# Patient Record
Sex: MALE | Race: OTHER | Employment: FULL TIME | URBAN - METROPOLITAN AREA
[De-identification: names, ages, dates, MRNs, and addresses within clinical notes are randomized per-mention and may not be internally consistent; named-entity substitution may affect disease eponyms.]

---
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
## 2024-03-23 ENCOUNTER — APPOINTMENT (OUTPATIENT)
Dept: GENERAL RADIOLOGY | Age: 19
End: 2024-03-23

## 2024-03-23 ENCOUNTER — HOSPITAL ENCOUNTER (EMERGENCY)
Age: 19
Discharge: HOME | End: 2024-03-23
Attending: EMERGENCY MEDICINE

## 2024-03-23 VITALS
HEART RATE: 98 BPM | TEMPERATURE: 97.2 F | WEIGHT: 213 LBS | DIASTOLIC BLOOD PRESSURE: 92 MMHG | HEIGHT: 66 IN | BODY MASS INDEX: 34.23 KG/M2 | RESPIRATION RATE: 16 BRPM | OXYGEN SATURATION: 100 % | SYSTOLIC BLOOD PRESSURE: 146 MMHG

## 2024-03-23 DIAGNOSIS — S76.312A STRAIN OF LEFT HAMSTRING, INITIAL ENCOUNTER: Primary | ICD-10-CM

## 2024-03-23 PROCEDURE — 73552 X-RAY EXAM OF FEMUR 2/>: CPT

## 2024-03-23 PROCEDURE — 99283 EMERGENCY DEPT VISIT LOW MDM: CPT

## 2024-03-23 PROCEDURE — 73502 X-RAY EXAM HIP UNI 2-3 VIEWS: CPT

## 2024-03-23 PROCEDURE — 6370000000 HC RX 637 (ALT 250 FOR IP): Performed by: PHYSICIAN ASSISTANT

## 2024-03-23 RX ORDER — LIDOCAINE 4 G/G
1 PATCH TOPICAL DAILY
Qty: 30 PATCH | Refills: 0 | Status: SHIPPED | OUTPATIENT
Start: 2024-03-23 | End: 2024-04-22

## 2024-03-23 RX ORDER — IBUPROFEN 800 MG/1
800 TABLET ORAL EVERY 8 HOURS PRN
Qty: 20 TABLET | Refills: 0 | Status: SHIPPED | OUTPATIENT
Start: 2024-03-23

## 2024-03-23 RX ORDER — HYDROCODONE BITARTRATE AND ACETAMINOPHEN 5; 325 MG/1; MG/1
1 TABLET ORAL ONCE
Status: COMPLETED | OUTPATIENT
Start: 2024-03-23 | End: 2024-03-23

## 2024-03-23 RX ADMIN — HYDROCODONE BITARTRATE AND ACETAMINOPHEN 1 TABLET: 5; 325 TABLET ORAL at 10:59

## 2024-03-23 ASSESSMENT — ENCOUNTER SYMPTOMS
ABDOMINAL PAIN: 0
BACK PAIN: 0
COLOR CHANGE: 0
SHORTNESS OF BREATH: 0

## 2024-03-23 ASSESSMENT — PAIN SCALES - GENERAL: PAINLEVEL_OUTOF10: 10

## 2024-03-23 NOTE — ED PROVIDER NOTES
[facility] EMERGENCY DEPARTMENT  EMERGENCY DEPARTMENT ENCOUNTER        Pt Name: Alec Le  MRN: [id number]  Birthdate 2005  Date of evaluation: 3/23/2024  Provider: Cheri Phillips PA-C  PCP: No primary care provider on file.  Note Started: 11:12 AM EDT 3/23/24       I have seen and evaluated this patient with my supervising physician Stephen Rodriguez MD.      CHIEF COMPLAINT       Chief Complaint   Patient presents with    Leg Pain     Pt to ED c/o left leg pain after doing vertical splits after slipping on wet grass last night. PT took advil 200 mg x30 min ago.        HISTORY OF PRESENT ILLNESS: 1 or more Elements     History from : Patient    Limitations to history : None    Alec Le is a 19 y.o. male who presents to the emergency department complaining of left posterior hip and posterior thigh pain status post injury which occurred last night.  Patient states that he was drinking alcohol and did the splits on wet grass.  He felt a mild pull in the left hamstring.  This morning, the pain was more severe.  He took Advil prior to arrival.  He rates his pain to be a 10 out of 10 on pain scale.  Denies numbness, tingling, weakness, head trauma or other associated injury.  He is able to bear weight and ambulate.    Nursing Notes were all reviewed and agreed with or any disagreements were addressed in the HPI.    REVIEW OF SYSTEMS :      Review of Systems   Constitutional:  Negative for chills and fever.   Respiratory:  Negative for shortness of breath.    Cardiovascular:  Negative for chest pain, palpitations and leg swelling.   Gastrointestinal:  Negative for abdominal pain.   Genitourinary: Negative.    Musculoskeletal:  Positive for myalgias. Negative for back pain, gait problem, neck pain and neck stiffness.   Skin:  Negative for color change, pallor, rash and wound.   Neurological: Negative.    All other systems reviewed and are negative.      Positives and Pertinent

## 2024-03-23 NOTE — ED PROVIDER NOTES
I have personally performed a face to face diagnostic evaluation on this patient. I have fully participated in the care of this patient I personally saw the patient and performed a substantive portion of the visit including all aspects of the medical decision making.  I have reviewed and agree with all pertinent clinical information including history, physical exam, diagnostic tests, and the plan.      HPI: Alec Le presented with left posterior hip and left posterior thigh pain after he did the splits last night on wet grass.  [place] a pull in his left hamstring this morning pain is more severe worse with sitting better with standing.  Took Tylenol prior to arrival.  No numbness or tingling no bowel or bladder incontinence.  No other trauma.  Chief Complaint   Patient presents with    Leg Pain     Pt to ED c/o left leg pain after doing vertical splits after slipping on wet grass last night. PT took advil 200 mg x30 min ago.       Review of Systems: See LINDSEY note  Vital Signs: BP (!) 146/92   Pulse 98   Temp 97.2 °F (36.2 °C) (Oral)   Resp 16   Ht 1.676 m (5' 6\")   Wt 96.6 kg (213 lb)   SpO2 100%   BMI 34.38 kg/m²     Alert 19 y.o. male who does not appear toxic or acutely ill  HENT: Atraumatic  Neck: Grossly normal ROM  Chest/Lungs: respiratory effort normal  Extremities: 2+ DP/PT bilaterally  Musculoskeletal: Grossly normal ROM, tenderness to palpation of the posterior thigh, hamstrings, gluteus with normal range of motion and with no punched or easily palpated muscular strands, no saddle anesthesia, ambulatory without difficulty  Skin: No palor or diaphoresis    Medical Decision Making and Plan:  Pertinent Labs & Imaging studies reviewed. (See LINDSEY chart for details)  I agree with LINDSEY assessment and plan.  Well-appearing 19-year-old male who performed the splits last night while drinking on wet grass likely with a pulled muscle to his posterior hamstrings.  Exam is neurovascularly intact at this time.

## 2024-12-08 ENCOUNTER — HOSPITAL ENCOUNTER (EMERGENCY)
Age: 19
Discharge: HOME | End: 2024-12-08
Attending: STUDENT IN AN ORGANIZED HEALTH CARE EDUCATION/TRAINING PROGRAM

## 2024-12-08 ENCOUNTER — APPOINTMENT (OUTPATIENT)
Dept: GENERAL RADIOLOGY | Age: 19
End: 2024-12-08

## 2024-12-08 VITALS
OXYGEN SATURATION: 94 % | SYSTOLIC BLOOD PRESSURE: 112 MMHG | BODY MASS INDEX: 34.87 KG/M2 | RESPIRATION RATE: 18 BRPM | HEIGHT: 66 IN | WEIGHT: 217 LBS | HEART RATE: 80 BPM | TEMPERATURE: 97.1 F | DIASTOLIC BLOOD PRESSURE: 63 MMHG

## 2024-12-08 DIAGNOSIS — R06.4 HYPERVENTILATING: ICD-10-CM

## 2024-12-08 DIAGNOSIS — R29.0 CARPOPEDAL SPASM: ICD-10-CM

## 2024-12-08 DIAGNOSIS — E83.42 HYPOMAGNESEMIA: ICD-10-CM

## 2024-12-08 DIAGNOSIS — E87.6 HYPOKALEMIA: ICD-10-CM

## 2024-12-08 DIAGNOSIS — M79.604 ACUTE LEG PAIN, RIGHT: Primary | ICD-10-CM

## 2024-12-08 DIAGNOSIS — L53.9 ERYTHEMA: ICD-10-CM

## 2024-12-08 DIAGNOSIS — F12.90 MARIJUANA SMOKER: ICD-10-CM

## 2024-12-08 LAB
ALBUMIN SERPL-MCNC: 4.5 G/DL (ref 3.4–5)
ALBUMIN/GLOB SERPL: 1.3 {RATIO} (ref 1.1–2.2)
ALP SERPL-CCNC: 74 U/L (ref 40–129)
ALT SERPL-CCNC: 26 U/L (ref 10–40)
ANION GAP SERPL CALCULATED.3IONS-SCNC: 18 MMOL/L (ref 3–16)
AST SERPL-CCNC: 23 U/L (ref 15–37)
BASOPHILS # BLD: 0 K/UL (ref 0–0.2)
BASOPHILS NFR BLD: 0.2 %
BILIRUB SERPL-MCNC: 1 MG/DL (ref 0–1)
BUN SERPL-MCNC: 7 MG/DL (ref 7–20)
CALCIUM SERPL-MCNC: 9.8 MG/DL (ref 8.3–10.6)
CHLORIDE SERPL-SCNC: 98 MMOL/L (ref 99–110)
CO2 SERPL-SCNC: 20 MMOL/L (ref 21–32)
CREAT SERPL-MCNC: 0.9 MG/DL (ref 0.9–1.3)
DEPRECATED RDW RBC AUTO: 13 % (ref 12.4–15.4)
EOSINOPHIL # BLD: 0 K/UL (ref 0–0.6)
EOSINOPHIL NFR BLD: 0 %
FLUAV RNA RESP QL NAA+PROBE: NOT DETECTED
FLUBV RNA RESP QL NAA+PROBE: NOT DETECTED
GFR SERPLBLD CREATININE-BSD FMLA CKD-EPI: >90 ML/MIN/{1.73_M2}
GLUCOSE BLD-MCNC: 114 MG/DL
GLUCOSE BLD-MCNC: 114 MG/DL (ref 70–99)
GLUCOSE SERPL-MCNC: 111 MG/DL (ref 70–99)
HCT VFR BLD AUTO: 43.1 % (ref 40.5–52.5)
HGB BLD-MCNC: 15.4 G/DL (ref 13.5–17.5)
LYMPHOCYTES # BLD: 0.7 K/UL (ref 1–5.1)
LYMPHOCYTES NFR BLD: 4.7 %
MAGNESIUM SERPL-MCNC: 1.3 MG/DL (ref 1.8–2.4)
MCH RBC QN AUTO: 33.2 PG (ref 26–34)
MCHC RBC AUTO-ENTMCNC: 35.8 G/DL (ref 31–36)
MCV RBC AUTO: 92.7 FL (ref 80–100)
MONOCYTES # BLD: 0.8 K/UL (ref 0–1.3)
MONOCYTES NFR BLD: 5.6 %
NEUTROPHILS # BLD: 13.3 K/UL (ref 1.7–7.7)
NEUTROPHILS NFR BLD: 89.5 %
PERFORMED ON: ABNORMAL
PLATELET # BLD AUTO: 228 K/UL (ref 135–450)
PMV BLD AUTO: 8 FL (ref 5–10.5)
POTASSIUM SERPL-SCNC: 3.1 MMOL/L (ref 3.5–5.1)
PROT SERPL-MCNC: 8 G/DL (ref 6.4–8.2)
RBC # BLD AUTO: 4.65 M/UL (ref 4.2–5.9)
SARS-COV-2 RNA RESP QL NAA+PROBE: NOT DETECTED
SODIUM SERPL-SCNC: 136 MMOL/L (ref 136–145)
WBC # BLD AUTO: 14.9 K/UL (ref 4–11)

## 2024-12-08 PROCEDURE — 6360000002 HC RX W HCPCS: Performed by: PHYSICIAN ASSISTANT

## 2024-12-08 PROCEDURE — 80053 COMPREHEN METABOLIC PANEL: CPT

## 2024-12-08 PROCEDURE — 96366 THER/PROPH/DIAG IV INF ADDON: CPT

## 2024-12-08 PROCEDURE — 96365 THER/PROPH/DIAG IV INF INIT: CPT

## 2024-12-08 PROCEDURE — 85025 COMPLETE CBC W/AUTO DIFF WBC: CPT

## 2024-12-08 PROCEDURE — 96372 THER/PROPH/DIAG INJ SC/IM: CPT

## 2024-12-08 PROCEDURE — 99285 EMERGENCY DEPT VISIT HI MDM: CPT

## 2024-12-08 PROCEDURE — 83735 ASSAY OF MAGNESIUM: CPT

## 2024-12-08 PROCEDURE — 87636 SARSCOV2 & INF A&B AMP PRB: CPT

## 2024-12-08 PROCEDURE — 93005 ELECTROCARDIOGRAM TRACING: CPT | Performed by: PHYSICIAN ASSISTANT

## 2024-12-08 PROCEDURE — 73590 X-RAY EXAM OF LOWER LEG: CPT

## 2024-12-08 PROCEDURE — 6370000000 HC RX 637 (ALT 250 FOR IP): Performed by: PHYSICIAN ASSISTANT

## 2024-12-08 RX ORDER — ORPHENADRINE CITRATE 30 MG/ML
60 INJECTION INTRAMUSCULAR; INTRAVENOUS ONCE
Status: COMPLETED | OUTPATIENT
Start: 2024-12-08 | End: 2024-12-08

## 2024-12-08 RX ORDER — NAPROXEN 500 MG/1
500 TABLET ORAL 2 TIMES DAILY
Qty: 20 TABLET | Refills: 0 | Status: SHIPPED | OUTPATIENT
Start: 2024-12-08 | End: 2024-12-18

## 2024-12-08 RX ORDER — MAGNESIUM SULFATE IN WATER 40 MG/ML
2000 INJECTION, SOLUTION INTRAVENOUS ONCE
Status: COMPLETED | OUTPATIENT
Start: 2024-12-08 | End: 2024-12-08

## 2024-12-08 RX ORDER — KETOROLAC TROMETHAMINE 30 MG/ML
30 INJECTION, SOLUTION INTRAMUSCULAR; INTRAVENOUS ONCE
Status: COMPLETED | OUTPATIENT
Start: 2024-12-08 | End: 2024-12-08

## 2024-12-08 RX ORDER — POTASSIUM CHLORIDE 1500 MG/1
40 TABLET, EXTENDED RELEASE ORAL ONCE
Status: COMPLETED | OUTPATIENT
Start: 2024-12-08 | End: 2024-12-08

## 2024-12-08 RX ADMIN — POTASSIUM CHLORIDE 40 MEQ: 1500 TABLET, EXTENDED RELEASE ORAL at 21:37

## 2024-12-08 RX ADMIN — MAGNESIUM SULFATE HEPTAHYDRATE 2000 MG: 40 INJECTION, SOLUTION INTRAVENOUS at 21:41

## 2024-12-08 RX ADMIN — ORPHENADRINE CITRATE 60 MG: 60 INJECTION INTRAMUSCULAR; INTRAVENOUS at 20:03

## 2024-12-08 RX ADMIN — KETOROLAC TROMETHAMINE 30 MG: 30 INJECTION, SOLUTION INTRAMUSCULAR at 20:04

## 2024-12-08 ASSESSMENT — PAIN SCALES - GENERAL
PAINLEVEL_OUTOF10: 10
PAINLEVEL_OUTOF10: 10

## 2024-12-08 ASSESSMENT — PAIN - FUNCTIONAL ASSESSMENT: PAIN_FUNCTIONAL_ASSESSMENT: 0-10

## 2024-12-09 LAB
EKG ATRIAL RATE: 96 BPM
EKG DIAGNOSIS: NORMAL
EKG P AXIS: 33 DEGREES
EKG P-R INTERVAL: 136 MS
EKG Q-T INTERVAL: 340 MS
EKG QRS DURATION: 88 MS
EKG QTC CALCULATION (BAZETT): 429 MS
EKG R AXIS: 53 DEGREES
EKG T AXIS: 33 DEGREES
EKG VENTRICULAR RATE: 96 BPM

## 2024-12-09 PROCEDURE — 93010 ELECTROCARDIOGRAM REPORT: CPT | Performed by: INTERNAL MEDICINE

## 2024-12-09 NOTE — ED PROVIDER NOTES
to:  Infection is not suspected    Screenings     [place] Coma Scale  Eye Opening: Spontaneous  Best Verbal Response: Oriented  Best Motor Response: Obeys commands  Manjinder Coma Scale Score: 15        Labs Reviewed   CBC WITH AUTO DIFFERENTIAL - Abnormal; Notable for the following components:       Result Value    WBC 14.9 (*)     Neutrophils Absolute 13.3 (*)     Lymphocytes Absolute 0.7 (*)     All other components within normal limits   COMPREHENSIVE METABOLIC PANEL - Abnormal; Notable for the following components:    Potassium 3.1 (*)     Chloride 98 (*)     CO2 20 (*)     Anion Gap 18 (*)     Glucose 111 (*)     All other components within normal limits   MAGNESIUM - Abnormal; Notable for the following components:    Magnesium 1.30 (*)     All other components within normal limits   POCT GLUCOSE - Abnormal; Notable for the following components:    POC Glucose 114 (*)     All other components within normal limits   POCT GLUCOSE - Normal   COVID-19 & INFLUENZA COMBO     Medications   ketorolac (TORADOL) injection 30 mg (30 mg IntraMUSCular Given 12/2004)   orphenadrine (NORFLEX) injection 60 mg (60 mg IntraMUSCular Given 12/8/24 2003)   magnesium sulfate 2000 mg in 50 mL IVPB premix (0 mg IntraVENous Stopped 12/8/24 2341)   potassium chloride (KLOR-CON M) extended release tablet 40 mEq (40 mEq Oral Given 12/8/24 2137)            Patient Referrals:  [facility]  [address] [facility] [address] [place] [address]  [phone]          Discharge Medications:  New Prescriptions    NAPROXEN (NAPROSYN) 500 MG TABLET    Take 1 tablet by mouth 2 times daily for 20 doses       FINAL IMPRESSION  1. Acute leg pain, right    2. Erythema    3. Hypokalemia    4. Hypomagnesemia    5. Carpopedal spasm    6. Hyperventilating    7. Marijuana smoker        Blood pressure 112/63, pulse 80, temperature 97.1 °F (36.2 °C), temperature source Oral, resp. rate 18, height 1.676 m 
Zuleima Phillips PA-C (electronically signed)            Cheri Phillips PA-C  12/08/24 [id number]       Cheri Phlilips PA-C  12/08/24 [id number]

## 2024-12-09 NOTE — ED NOTES
Pt brought to room 07 from [facility] pt seen here for possible insect bite to anterior right lower leg first noticed this morning thinks it may have happened last night or while he was sleeping, is painful and red around area, pt received pain medication previously in [facility] states is feeling some improvement in pain since, denies any other needs or complaints at this time. Pt has 20g IV noted to R AC c/d/i, bed locked lowest position, rails up x2, call light within reach, family at bedside.